# Patient Record
Sex: MALE | Race: AMERICAN INDIAN OR ALASKA NATIVE
[De-identification: names, ages, dates, MRNs, and addresses within clinical notes are randomized per-mention and may not be internally consistent; named-entity substitution may affect disease eponyms.]

---

## 2017-12-05 ENCOUNTER — HOSPITAL ENCOUNTER (EMERGENCY)
Dept: HOSPITAL 31 - C.ER | Age: 27
LOS: 1 days | Discharge: HOME | End: 2017-12-06
Payer: SELF-PAY

## 2017-12-05 VITALS — OXYGEN SATURATION: 98 %
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
## 2017-12-05 DIAGNOSIS — S16.1XXA: ICD-10-CM

## 2017-12-05 DIAGNOSIS — S09.90XA: Primary | ICD-10-CM
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
## 2017-12-06 VITALS
DIASTOLIC BLOOD PRESSURE: 76 MMHG | HEART RATE: 74 BPM | RESPIRATION RATE: 20 BRPM | TEMPERATURE: 98.1 F | SYSTOLIC BLOOD PRESSURE: 130 MMHG

## 2017-12-06 NOTE — RAD
PROCEDURE:  Cervical Spine Radiographs.



HISTORY:

Pain. 



COMPARISON:

None available 



FINDINGS:



BONES:

Straightening of the normal cervical lordosis may be related to 

muscle spasm or positioning. Alignment appears otherwise 

satisfactory. No listhesis. No acute displaced fracture identified.



DISC SPACES:

Unremarkable. 



SOFT TISSUES:

Unremarkable. No prevertebral soft tissue swelling. 



OTHER FINDINGS:

None.



IMPRESSION:

Straightening of the normal cervical lordosis may be related to 

muscle spasm or positioning.

## 2017-12-06 NOTE — C.PDOC
History Of Present Illness


27 year old male presents to the ED for evaluation of headache and neck pain 

which began earlier today. Patient states he was at work when a 200lb box fell 

onto his head. Patient reports that he lost consciousness. Patient was then 

evaluated by the employee health doctor at this work, who cleared the patient 

without undergoing any imaging. Patient now complains of headache and neck pain

, and is requesting imaging studies. He denies vision change, nausea, vomiting, 

extremity numbness/weakness. 


Time Seen by Provider: 12/05/17 22:55


Chief Complaint (Nursing): Medical Clearance


History Per: Patient


History/Exam Limitations: no limitations


Onset/Duration Of Symptoms: Hrs


Current Symptoms Are (Timing): Still Present


Additional History Per: Patient





Past Medical History


Reviewed: Historical Data, Nursing Documentation, Vital Signs


Vital Signs: 


 Last Vital Signs











Temp  97.9 F   12/05/17 22:06


 


Pulse  72   12/05/17 22:06


 


Resp  18   12/05/17 22:06


 


BP  144/84   12/05/17 22:06


 


Pulse Ox  98   12/06/17 00:50














- Medical History


PMH: No Chronic Diseases


Surgical History: No Surg Hx


Family History: States: Unknown Family Hx





- Social History


Hx Tobacco Use: Yes


Hx Alcohol Use: No


Hx Substance Use: No





- Immunization History


Hx Tetanus Toxoid Vaccination: Yes (2015)


Hx Influenza Vaccination: No


Hx Pneumococcal Vaccination: No





Review Of Systems


Musculoskeletal: Positive for: Neck Pain


Neurological: Positive for: Headache





Physical Exam





- Physical Exam


Appears: Non-toxic, No Acute Distress


Skin: Normal Color, Warm, Dry


Head: Tenderness (to right temporal-parietal scalp region ), No Abrasion, No 

Laceration


Eye(s): bilateral: Normal Inspection


Oral Mucosa: Moist


Neck: Paracervical Tenderness, No Step Off Deformity, No Other (gross 

deformities)


Chest: Symmetrical, No Deformity, No Tenderness


Cardiovascular: Rhythm Regular, No Murmur


Respiratory: Normal Breath Sounds, No Rales, No Rhonchi, No Wheezing


Extremity: Normal ROM, Capillary Refill (less than 2 seconds )


Neurological/Psych: Oriented x3, Normal Speech, Normal Cognition


Gait: Steady





ED Course And Treatment


O2 Sat by Pulse Oximetry: 98 (on RA)


Pulse Ox Interpretation: Normal





Medical Decision Making


Medical Decision Making: 





Assessment: minor head injury and cervical strain 


Progress: 


CT Head and Cervical Spine XR ordered and reviewed. 





ct head nad





c-spine xray - prelim reading no fx.





patient resting comfortably, discharged home to follow up with worker's 

compensation department. 








Disposition


Counseled Patient/Family Regarding: Studies Performed, Diagnosis, Need For 

Followup, Rx Given





- Disposition


Referrals: 


Non [facility] Provider, [Primary Care Provider] - 


[facility] at [facility] [Outside]


Disposition: HOME/ ROUTINE


Disposition Time: 00:51


Condition: IMPROVED


Additional Instructions: 


follow up with worker's compensation department in 2 days


take pain medication as needed


return to hospital if symptoms worsens or progress


ice to injured area





Prescriptions: 


Cyclobenzaprine [Cyclobenzaprine HCl] 10 mg PO TID PRN #12 tab


 PRN Reason: Muscle Spasm


Naproxen [Naprosyn] 500 mg PO BID PRN #16 tab


 PRN Reason: Pain, Moderate (4-7)


Instructions:  Head Injury (ED), Concussion (ED)


Forms:  [facility] (English), General Discharge Instructions





- Clinical Impression


Clinical Impression: 


 Head injury, Cervical strain








- Scribe Statement


The provider has reviewed the documentation as recorded by the Scribe (Jenny Wong)


Provider Attestation: 








All medical record entries made by the Scribe were at my direction and 

personally dictated by me. I have reviewed the chart and agree that the record 

accurately reflects my personal performance of the history, physical exam, 

medical decision making, and the department course for this patient. I have 

also personally directed, reviewed, and agree with the discharge instructions 

and disposition.

## 2018-04-05 ENCOUNTER — HOSPITAL ENCOUNTER (EMERGENCY)
Dept: HOSPITAL 31 - C.ER | Age: 28
LOS: 1 days | Discharge: HOME | End: 2018-04-06
Payer: SELF-PAY

## 2018-04-05 DIAGNOSIS — S20.212A: Primary | ICD-10-CM
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
## 2018-04-05 LAB
BASOPHILS # BLD AUTO: 0.1 K/UL (ref 0–0.2)
BASOPHILS NFR BLD: 0.7 % (ref 0–2)
EOSINOPHIL # BLD AUTO: 0.1 K/UL (ref 0–0.7)
EOSINOPHIL NFR BLD: 0.7 % (ref 0–4)
ERYTHROCYTE [DISTWIDTH] IN BLOOD BY AUTOMATED COUNT: 13.6 % (ref 11.5–14.5)
HGB BLD-MCNC: 16.1 G/DL (ref 12–18)
LYMPHOCYTES # BLD AUTO: 2.3 K/UL (ref 1–4.3)
LYMPHOCYTES NFR BLD AUTO: 25.7 % (ref 20–40)
MCH RBC QN AUTO: 32.4 PG (ref 27–31)
MCHC RBC AUTO-ENTMCNC: 35.4 G/DL (ref 33–37)
MCV RBC AUTO: 91.5 FL (ref 80–94)
MONOCYTES # BLD: 1 K/UL (ref 0–0.8)
MONOCYTES NFR BLD: 10.9 % (ref 0–10)
NEUTROPHILS # BLD: 5.5 K/UL (ref 1.8–7)
NEUTROPHILS NFR BLD AUTO: 62 % (ref 50–75)
NRBC BLD AUTO-RTO: 0 % (ref 0–2)
PLATELET # BLD: 275 K/UL (ref 130–400)
PMV BLD AUTO: 6.7 FL (ref 7.2–11.7)
RBC # BLD AUTO: 4.99 MIL/UL (ref 4.4–5.9)
WBC # BLD AUTO: 8.9 K/UL (ref 4.8–10.8)

## 2018-04-05 PROCEDURE — 84484 ASSAY OF TROPONIN QUANT: CPT

## 2018-04-05 PROCEDURE — 80053 COMPREHEN METABOLIC PANEL: CPT

## 2018-04-05 PROCEDURE — 99285 EMERGENCY DEPT VISIT HI MDM: CPT

## 2018-04-05 PROCEDURE — 71250 CT THORAX DX C-: CPT

## 2018-04-05 PROCEDURE — 85025 COMPLETE CBC W/AUTO DIFF WBC: CPT

## 2018-04-05 PROCEDURE — 96374 THER/PROPH/DIAG INJ IV PUSH: CPT

## 2018-04-05 PROCEDURE — 96375 TX/PRO/DX INJ NEW DRUG ADDON: CPT

## 2018-04-05 PROCEDURE — 93005 ELECTROCARDIOGRAM TRACING: CPT

## 2018-04-05 PROCEDURE — 83690 ASSAY OF LIPASE: CPT

## 2018-04-06 VITALS — HEART RATE: 72 BPM

## 2018-04-06 VITALS
DIASTOLIC BLOOD PRESSURE: 87 MMHG | OXYGEN SATURATION: 99 % | SYSTOLIC BLOOD PRESSURE: 152 MMHG | RESPIRATION RATE: 16 BRPM | TEMPERATURE: 98.1 F

## 2018-04-06 LAB
ALBUMIN SERPL-MCNC: 5.1 G/DL (ref 3.5–5)
ALBUMIN/GLOB SERPL: 1.1 {RATIO} (ref 1–2.1)
ALT SERPL-CCNC: 24 U/L (ref 21–72)
AST SERPL-CCNC: 21 U/L (ref 17–59)
BUN SERPL-MCNC: 14 MG/DL (ref 9–20)
CALCIUM SERPL-MCNC: 10.2 MG/DL (ref 8.6–10.4)
GFR NON-AFRICAN AMERICAN: > 60
LIPASE: 49 U/L (ref 23–300)
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
## 2018-04-06 NOTE — CARD
--------------- APPROVED REPORT --------------





EKG Measurement

Heart [id number]

[id number]

[id number]

[id number]

[id number]



<Conclusion>

Normal sinus rhythm

Possible Left atrial enlargement

Nonspecific T wave abnormality

Incopelte RBBB

Abnormal ECG

## 2023-06-18 NOTE — CT
EXAM:

  CT Head Without Intravenous Contrast



CLINICAL HISTORY:

  27 years old, male; Signs and symptoms; Dizziness



TECHNIQUE:

  Axial computed tomography images of the head/brain without intravenous 

contrast.  All CT scans at this facility use one or more dose reduction 

techniques, viz.: automated exposure control; ma/kV adjustment per patient size 

(including targeted exams where dose is matched to indication; i.e. head); or 

iterative reconstruction technique.

  Coronal and sagittal reformatted images were created and reviewed.



COMPARISON:

  No relevant prior studies available.



FINDINGS:

  Brain:  No intracranial hemorrhage.  No mass.  No definite edema.

  Ventricles:  No hydrocephalus.

  Bones/joints:  No acute fracture.

  Soft tissues:  Unremarkable.

  Sinuses:  No acute sinusitis.

  Mastoid air cells:  No mastoid effusion.

  Orbits:  Unremarkable as visualized.

  Nasopharynx:  Enlarged adenoids.



IMPRESSION:     

1.  No acute intracranial abnormality.

2.  Incidental/non-acute findings are described above. Pre-procedure checklist reviewed, AUC complete and pre-sedation note complete.    MD aware of maximum contrast dose of 270 mL.